# Patient Record
Sex: MALE | Race: WHITE
[De-identification: names, ages, dates, MRNs, and addresses within clinical notes are randomized per-mention and may not be internally consistent; named-entity substitution may affect disease eponyms.]

---

## 2018-08-28 ENCOUNTER — HOSPITAL ENCOUNTER (EMERGENCY)
Dept: HOSPITAL 25 - ED | Age: 33
Discharge: HOME | End: 2018-08-28
Payer: COMMERCIAL

## 2018-08-28 VITALS — DIASTOLIC BLOOD PRESSURE: 78 MMHG | SYSTOLIC BLOOD PRESSURE: 135 MMHG
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
## 2018-08-28 DIAGNOSIS — S43.004A: Primary | ICD-10-CM

## 2018-08-28 DIAGNOSIS — M25.511: ICD-10-CM

## 2018-08-28 PROCEDURE — 96374 THER/PROPH/DIAG INJ IV PUSH: CPT

## 2018-08-28 PROCEDURE — 96375 TX/PRO/DX INJ NEW DRUG ADDON: CPT

## 2018-08-28 PROCEDURE — 99282 EMERGENCY DEPT VISIT SF MDM: CPT
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
## 2018-08-28 NOTE — RAD
Indication: RIGHT shoulder pain post fall with dislocation. Limited range of motion.



Comparison: No relevant prior exams available on the [facility] PACS for comparison.



Technique: AP and scapular Y views RIGHT shoulder



REPORT AND IMPRESSION: 

#.   Anterior glenohumeral dislocation. 

#.  No associated Hill-Sachs impaction fracture or osseous Bankart lesion evident. 

#.  Normal acromioclavicular joint alignment. 

#.  Soft tissue swelling about the shoulder.